# Patient Record
Sex: MALE | Race: WHITE | Employment: OTHER | URBAN - METROPOLITAN AREA
[De-identification: names, ages, dates, MRNs, and addresses within clinical notes are randomized per-mention and may not be internally consistent; named-entity substitution may affect disease eponyms.]

---

## 2021-04-10 ENCOUNTER — HOSPITAL ENCOUNTER (EMERGENCY)
Age: 86
Discharge: HOME | End: 2021-04-10
Attending: EMERGENCY MEDICINE
Payer: MEDICARE

## 2021-04-10 ENCOUNTER — APPOINTMENT (OUTPATIENT)
Dept: CT IMAGING | Age: 86
End: 2021-04-10
Attending: NURSE PRACTITIONER
Payer: MEDICARE

## 2021-04-10 VITALS
WEIGHT: 182 LBS | BODY MASS INDEX: 26.96 KG/M2 | TEMPERATURE: 98.7 F | RESPIRATION RATE: 16 BRPM | DIASTOLIC BLOOD PRESSURE: 76 MMHG | HEIGHT: 69 IN | SYSTOLIC BLOOD PRESSURE: 147 MMHG | HEART RATE: 87 BPM | OXYGEN SATURATION: 98 %

## 2021-04-10 DIAGNOSIS — R53.1 WEAKNESS: Primary | ICD-10-CM

## 2021-04-10 DIAGNOSIS — K62.89 ANAL PAIN: ICD-10-CM

## 2021-04-10 DIAGNOSIS — E86.0 DEHYDRATION: ICD-10-CM

## 2021-04-10 LAB
ALBUMIN SERPL-MCNC: 3.6 G/DL (ref 3.5–5)
ALBUMIN/GLOB SERPL: 1 {RATIO} (ref 1.1–2.2)
ALP SERPL-CCNC: 59 U/L (ref 45–117)
ALT SERPL-CCNC: 19 U/L (ref 12–78)
ANION GAP SERPL CALC-SCNC: 4 MMOL/L (ref 5–15)
APPEARANCE UR: CLEAR
AST SERPL-CCNC: 25 U/L (ref 15–37)
BACTERIA URNS QL MICRO: NEGATIVE /HPF
BASOPHILS # BLD: 0 K/UL (ref 0–0.1)
BASOPHILS NFR BLD: 1 % (ref 0–1)
BILIRUB SERPL-MCNC: 0.7 MG/DL (ref 0.2–1)
BILIRUB UR QL: NEGATIVE
BUN SERPL-MCNC: 13 MG/DL (ref 6–20)
BUN/CREAT SERPL: 12 (ref 12–20)
CALCIUM SERPL-MCNC: 8.8 MG/DL (ref 8.5–10.1)
CHLORIDE SERPL-SCNC: 103 MMOL/L (ref 97–108)
CO2 SERPL-SCNC: 27 MMOL/L (ref 21–32)
COLOR UR: ABNORMAL
COMMENT, HOLDF: NORMAL
CREAT SERPL-MCNC: 1.08 MG/DL (ref 0.7–1.3)
DIFFERENTIAL METHOD BLD: NORMAL
EOSINOPHIL # BLD: 0.1 K/UL (ref 0–0.4)
EOSINOPHIL NFR BLD: 2 % (ref 0–7)
EPITH CASTS URNS QL MICRO: ABNORMAL /LPF
ERYTHROCYTE [DISTWIDTH] IN BLOOD BY AUTOMATED COUNT: 12.7 % (ref 11.5–14.5)
GLOBULIN SER CALC-MCNC: 3.7 G/DL (ref 2–4)
GLUCOSE SERPL-MCNC: 100 MG/DL (ref 65–100)
GLUCOSE UR STRIP.AUTO-MCNC: NEGATIVE MG/DL
HCT VFR BLD AUTO: 41.6 % (ref 36.6–50.3)
HGB BLD-MCNC: 14 G/DL (ref 12.1–17)
HGB UR QL STRIP: ABNORMAL
HYALINE CASTS URNS QL MICRO: ABNORMAL /LPF (ref 0–5)
IMM GRANULOCYTES # BLD AUTO: 0 K/UL (ref 0–0.04)
IMM GRANULOCYTES NFR BLD AUTO: 0 % (ref 0–0.5)
KETONES UR QL STRIP.AUTO: ABNORMAL MG/DL
LEUKOCYTE ESTERASE UR QL STRIP.AUTO: NEGATIVE
LIPASE SERPL-CCNC: 173 U/L (ref 73–393)
LYMPHOCYTES # BLD: 1 K/UL (ref 0.8–3.5)
LYMPHOCYTES NFR BLD: 19 % (ref 12–49)
MCH RBC QN AUTO: 31 PG (ref 26–34)
MCHC RBC AUTO-ENTMCNC: 33.7 G/DL (ref 30–36.5)
MCV RBC AUTO: 92.2 FL (ref 80–99)
MONOCYTES # BLD: 0.5 K/UL (ref 0–1)
MONOCYTES NFR BLD: 9 % (ref 5–13)
NEUTS SEG # BLD: 3.7 K/UL (ref 1.8–8)
NEUTS SEG NFR BLD: 69 % (ref 32–75)
NITRITE UR QL STRIP.AUTO: NEGATIVE
NRBC # BLD: 0 K/UL (ref 0–0.01)
NRBC BLD-RTO: 0 PER 100 WBC
PH UR STRIP: 6.5 [PH] (ref 5–8)
PLATELET # BLD AUTO: 241 K/UL (ref 150–400)
PMV BLD AUTO: 8.9 FL (ref 8.9–12.9)
POTASSIUM SERPL-SCNC: 4.4 MMOL/L (ref 3.5–5.1)
PROT SERPL-MCNC: 7.3 G/DL (ref 6.4–8.2)
PROT UR STRIP-MCNC: NEGATIVE MG/DL
RBC # BLD AUTO: 4.51 M/UL (ref 4.1–5.7)
RBC #/AREA URNS HPF: ABNORMAL /HPF (ref 0–5)
SAMPLES BEING HELD,HOLD: NORMAL
SODIUM SERPL-SCNC: 134 MMOL/L (ref 136–145)
SP GR UR REFRACTOMETRY: 1.01 (ref 1–1.03)
UROBILINOGEN UR QL STRIP.AUTO: 0.2 EU/DL (ref 0.2–1)
WBC # BLD AUTO: 5.3 K/UL (ref 4.1–11.1)
WBC URNS QL MICRO: ABNORMAL /HPF (ref 0–4)

## 2021-04-10 PROCEDURE — 81001 URINALYSIS AUTO W/SCOPE: CPT

## 2021-04-10 PROCEDURE — 99285 EMERGENCY DEPT VISIT HI MDM: CPT

## 2021-04-10 PROCEDURE — 74177 CT ABD & PELVIS W/CONTRAST: CPT

## 2021-04-10 PROCEDURE — 74011000636 HC RX REV CODE- 636: Performed by: EMERGENCY MEDICINE

## 2021-04-10 PROCEDURE — 80053 COMPREHEN METABOLIC PANEL: CPT

## 2021-04-10 PROCEDURE — 72125 CT NECK SPINE W/O DYE: CPT

## 2021-04-10 PROCEDURE — 85025 COMPLETE CBC W/AUTO DIFF WBC: CPT

## 2021-04-10 PROCEDURE — 70450 CT HEAD/BRAIN W/O DYE: CPT

## 2021-04-10 PROCEDURE — 96360 HYDRATION IV INFUSION INIT: CPT

## 2021-04-10 PROCEDURE — 83690 ASSAY OF LIPASE: CPT

## 2021-04-10 PROCEDURE — 74011250636 HC RX REV CODE- 250/636: Performed by: NURSE PRACTITIONER

## 2021-04-10 RX ADMIN — SODIUM CHLORIDE 500 ML: 9 INJECTION, SOLUTION INTRAVENOUS at 10:45

## 2021-04-10 RX ADMIN — IOPAMIDOL 100 ML: 755 INJECTION, SOLUTION INTRAVENOUS at 11:07

## 2021-04-10 NOTE — PROGRESS NOTES
3:25 PM  CM received notification from [facility], pt accepted for home health services. Called and informed pt's daughter. Haylee Lewis    12:55 PM  CM met with pt, pt's spouse and their daughter in room to discuss assisted living resources as pt and pt's wife now agreeable to assisted living. CM gave pt's daughter a list of facilities and a contact for a company to assist with the search. Pt would benefit from home health until an assisted living facility can be located. CM spoke to MD and order placed. Family agreeable to referral to home health- [facility]. Will send referral via [employer]. Family requested list of private duty providers, CM provided to family. Their granddaughter will spend the night with them and their daughter will work on locating an assisted living facility and private duty providers.  Haylee Lewis

## 2021-04-10 NOTE — ED TRIAGE NOTES
2nd COVID vaccine on Wednesday and has been having diarrhea, right flank pain, and weakness. Pt reports formed stools today without and any pain but some residual weakness and dizziness on standing.

## 2021-04-10 NOTE — ED PROVIDER NOTES
This is an 82-year-old male who presents to the emergency room with complaints of diarrhea, right flank pain and generalized weakness since his second Covid vaccine on 30 March. Patient states he also fell and hit his head a few days ago. Patient is also complaining of hemorrhoid that was bothering him but has now resolved. According to daughter who is at bedside patient has had a change in his mental status since his last Covid vaccine on the 30th. Patient states it is worsened since he fell and hit his head. Has not sought hospitalization prior to today. Patient denies taking any blood thinners. Denies chest pain, shortness of breath. Is a little bit dizzy but concedes he has not been eating or drinking the way that he should. States he is having some difficulty urinating, mild burning. Denies any nausea or vomiting, fever or chills. Daughter states he has had new onset incontinence of both bowel and bladder and increased confusion. There are no further complaints at this time. Of note: Patient and family exploring the option of assisted living. Wenceslao Mckeon DO  Past Medical History:  No date: Ill-defined condition      Comment:  dizziness-does exercises for  this  No date: Ill-defined condition      Comment:  Glaucoma with MAcular Degeneration  Past Surgical History:  1953: HX APPENDECTOMY  12/2013: HX CATARACT REMOVAL; Left  3/2003: HX OTHER SURGICAL      Comment:  R eye retinal tear repair             Past Medical History:   Diagnosis Date    Ill-defined condition     dizziness-does exercises for  this    Ill-defined condition     Glaucoma with MAcular Degeneration       Past Surgical History:   Procedure Laterality Date    HX APPENDECTOMY  1953    HX CATARACT REMOVAL Left 12/2013    HX OTHER SURGICAL  3/2003    R eye retinal tear repair         No family history on file.     Social History     Socioeconomic History    Marital status:      Spouse name: Not on file    Number of children: Not on file    Years of education: Not on file    Highest education level: Not on file   Occupational History    Not on file   Social Needs    Financial resource strain: Not on file    Food insecurity     Worry: Not on file     Inability: Not on file    Transportation needs     Medical: Not on file     Non-medical: Not on file   Tobacco Use    Smoking status: Never Smoker   Substance and Sexual Activity    Alcohol use: No    Drug use: No    Sexual activity: Not on file   Lifestyle    Physical activity     Days per week: Not on file     Minutes per session: Not on file    Stress: Not on file   Relationships    Social connections     Talks on phone: Not on file     Gets together: Not on file     Attends Gnosticist service: Not on file     Active member of club or organization: Not on file     Attends meetings of clubs or organizations: Not on file     Relationship status: Not on file    Intimate partner violence     Fear of current or ex partner: Not on file     Emotionally abused: Not on file     Physically abused: Not on file     Forced sexual activity: Not on file   Other Topics Concern    Not on file   Social History Narrative    Not on file         ALLERGIES: Patient has no known allergies. Review of Systems   Constitutional: Positive for appetite change. Negative for activity change, chills, fatigue and fever. HENT: Negative for congestion, ear discharge, ear pain, sinus pressure, sinus pain, sore throat and trouble swallowing. Eyes: Negative for photophobia, pain, redness, itching and visual disturbance. Respiratory: Negative for chest tightness and shortness of breath. Cardiovascular: Negative for chest pain and palpitations. Gastrointestinal: Negative for abdominal distention, abdominal pain, nausea and vomiting. Endocrine: Negative. Genitourinary: Positive for difficulty urinating and dysuria. Negative for frequency and urgency.    Musculoskeletal: Negative for back pain, neck pain and neck stiffness. Skin: Negative for color change, pallor, rash and wound. Allergic/Immunologic: Negative. Neurological: Positive for dizziness, weakness and light-headedness. Negative for syncope and headaches. Hematological: Does not bruise/bleed easily. Psychiatric/Behavioral: Positive for confusion. Negative for behavioral problems. The patient is not nervous/anxious. Vitals:    04/10/21 0845   Pulse: 80   Resp: 16   Temp: 98.7 °F (37.1 °C)   SpO2: 99%   Weight: 82.6 kg (182 lb)   Height: 5' 9\" (1.753 m)            Physical Exam  Vitals signs and nursing note reviewed. Constitutional:       General: He is not in acute distress. Appearance: Normal appearance. He is well-developed. He is not ill-appearing. HENT:      Head: Normocephalic and atraumatic. Right Ear: External ear normal.      Left Ear: External ear normal.      Nose: Nose normal.      Mouth/Throat:      Mouth: Mucous membranes are moist.   Eyes:      General:         Right eye: No discharge. Left eye: No discharge. Conjunctiva/sclera: Conjunctivae normal.      Pupils: Pupils are equal, round, and reactive to light. Neck:      Musculoskeletal: Normal range of motion and neck supple. Vascular: No JVD. Trachea: No tracheal deviation. Cardiovascular:      Rate and Rhythm: Normal rate and regular rhythm. Pulses: Normal pulses. Heart sounds: Normal heart sounds. No murmur. No gallop. Pulmonary:      Effort: Pulmonary effort is normal. No respiratory distress. Breath sounds: Normal breath sounds. No wheezing or rales. Chest:      Chest wall: No tenderness. Abdominal:      General: Bowel sounds are normal. There is no distension. Palpations: Abdomen is soft. Tenderness: There is no abdominal tenderness. There is no guarding or rebound. Genitourinary:     Comments: Stated difficulty urinating  Musculoskeletal: Normal range of motion. General: No tenderness. Skin:     General: Skin is warm and dry. Capillary Refill: Capillary refill takes 2 to 3 seconds. Coloration: Skin is not pale. Findings: No erythema or rash. Neurological:      General: No focal deficit present. Mental Status: He is alert. Motor: Weakness present. Coordination: Coordination normal.      Comments: Confusion at times     Psychiatric:         Behavior: Behavior normal.         Thought Content: Thought content normal.         Judgment: Judgment normal.          MDM  Number of Diagnoses or Management Options  Anal pain: new and requires workup  Dehydration: new and requires workup  Weakness: new and requires workup  Diagnosis management comments: Differential diagnosis includes head bleed, urinary tract infection, dementia and others. After review of laboratory data, imaging and physical assessment patient was discharged home and will follow up with PCP. Case management involved and was given referrals for assisted living facility. Return to the emergency room with worsening symptoms. Patient, wife and daughter all in agreement with plan of care. Discussed with Dr. Alexis Fair who is in agreement with plan of care.        Amount and/or Complexity of Data Reviewed  Clinical lab tests: ordered and reviewed  Tests in the radiology section of CPT®: ordered and reviewed  Discuss the patient with other providers: yes (Dr. Alexis Fair)         Labs Reviewed   URINALYSIS W/MICROSCOPIC - Abnormal; Notable for the following components:       Result Value    Ketone TRACE (*)     Blood TRACE (*)     All other components within normal limits   METABOLIC PANEL, COMPREHENSIVE - Abnormal; Notable for the following components:    Sodium 134 (*)     Anion gap 4 (*)     A-G Ratio 1.0 (*)     All other components within normal limits   SAMPLES BEING HELD   CBC WITH AUTOMATED DIFF   LIPASE     Ct Head Wo Cont    Result Date: 4/10/2021  No acute intracranial process is identified. Ct Spine Cerv Wo Cont    Result Date: 4/10/2021  1. No evidence of acute fracture. Ct Abd Pelv W Cont    Result Date: 4/10/2021  1. No acute intra-abdominal pathology. 2.  Small hyperdense nodular foci posterior to the gallbladder which appear to be extraluminal and may represent prominent veins. Consider nonemergent ultrasound. 3.  Focal aneurysmal dilatation or atherosclerotic ulcer in the abdominal aorta measuring 3 cm. 4.  Small nodular opacities in the anterior aspect of the right middle lobe which have the appearance of a chronic infectious or inflammatory process. 1:43 PM  Pt has been reexamined. Pt has no new complaints, changes or physical findings. Care plan outlined and precautions discussed. All available results were reviewed with pt. All medications were reviewed with pt. All of pt's questions and concerns were addressed. Pt agrees to F/U as instructed and agrees to return to ED upon further deterioration. Pt is ready to go home. Kathy Grier NP    Please note that this dictation was completed with [employer], the computer voice recognition software. Quite often unanticipated grammatical, syntax, homophones, and other interpretive errors are inadvertently transcribed by the computer software. Please disregard these errors. Please excuse any errors that have escaped final proofreading. Thank you.     Procedures

## 2021-04-20 ENCOUNTER — HOSPITAL ENCOUNTER (EMERGENCY)
Age: 86
Discharge: HOME | End: 2021-04-21
Attending: EMERGENCY MEDICINE
Payer: MEDICARE

## 2021-04-20 DIAGNOSIS — R29.6 UNWITNESSED FALL: Primary | ICD-10-CM

## 2021-04-20 DIAGNOSIS — G20 DEMENTIA DUE TO PARKINSON'S DISEASE WITHOUT BEHAVIORAL DISTURBANCE (HCC): ICD-10-CM

## 2021-04-20 DIAGNOSIS — S42.295A OTHER CLOSED NONDISPLACED FRACTURE OF PROXIMAL END OF LEFT HUMERUS, INITIAL ENCOUNTER: ICD-10-CM

## 2021-04-20 DIAGNOSIS — F02.80 DEMENTIA DUE TO PARKINSON'S DISEASE WITHOUT BEHAVIORAL DISTURBANCE (HCC): ICD-10-CM

## 2021-04-20 PROCEDURE — 99284 EMERGENCY DEPT VISIT MOD MDM: CPT

## 2021-04-20 RX ORDER — GLUCOSAMINE SULFATE 1500 MG
1 POWDER IN PACKET (EA) ORAL DAILY
COMMUNITY

## 2021-04-20 RX ORDER — CARBIDOPA AND LEVODOPA 25; 100 MG/1; MG/1
1 TABLET ORAL 3 TIMES DAILY
COMMUNITY

## 2021-04-20 RX ORDER — MELATONIN 5 MG
5 CAPSULE ORAL
COMMUNITY

## 2021-04-20 RX ORDER — LANOLIN ALCOHOL/MO/W.PET/CERES
1000 CREAM (GRAM) TOPICAL DAILY
COMMUNITY

## 2021-04-20 RX ORDER — CYANOCOBALAMIN 1000 UG/ML
1000 INJECTION, SOLUTION INTRAMUSCULAR; SUBCUTANEOUS
COMMUNITY

## 2021-04-21 ENCOUNTER — APPOINTMENT (OUTPATIENT)
Dept: CT IMAGING | Age: 86
End: 2021-04-21
Attending: EMERGENCY MEDICINE
Payer: MEDICARE

## 2021-04-21 ENCOUNTER — APPOINTMENT (OUTPATIENT)
Dept: GENERAL RADIOLOGY | Age: 86
End: 2021-04-21
Attending: EMERGENCY MEDICINE
Payer: MEDICARE

## 2021-04-21 VITALS
HEART RATE: 99 BPM | OXYGEN SATURATION: 96 % | RESPIRATION RATE: 17 BRPM | SYSTOLIC BLOOD PRESSURE: 137 MMHG | DIASTOLIC BLOOD PRESSURE: 69 MMHG | TEMPERATURE: 97.8 F

## 2021-04-21 PROCEDURE — 73060 X-RAY EXAM OF HUMERUS: CPT

## 2021-04-21 PROCEDURE — 70450 CT HEAD/BRAIN W/O DYE: CPT

## 2021-04-21 PROCEDURE — 74011250637 HC RX REV CODE- 250/637: Performed by: EMERGENCY MEDICINE

## 2021-04-21 PROCEDURE — 72125 CT NECK SPINE W/O DYE: CPT

## 2021-04-21 RX ORDER — ACETAMINOPHEN 500 MG
1000 TABLET ORAL
Status: DISCONTINUED | OUTPATIENT
Start: 2021-04-21 | End: 2021-04-21 | Stop reason: HOSPADM

## 2021-04-21 RX ORDER — POLYETHYLENE GLYCOL 3350 17 G/17G
17 POWDER, FOR SOLUTION ORAL DAILY
Qty: 238 G | Refills: 0 | Status: SHIPPED | OUTPATIENT
Start: 2021-04-21 | End: 2021-05-05

## 2021-04-21 RX ORDER — OXYCODONE HYDROCHLORIDE 5 MG/1
5 TABLET ORAL
Status: COMPLETED | OUTPATIENT
Start: 2021-04-21 | End: 2021-04-21

## 2021-04-21 RX ORDER — ACETAMINOPHEN 500 MG
1000 TABLET ORAL 3 TIMES DAILY
Qty: 24 TAB | Refills: 0 | Status: SHIPPED | OUTPATIENT
Start: 2021-04-21 | End: 2021-04-25

## 2021-04-21 RX ORDER — OXYCODONE HYDROCHLORIDE 5 MG/1
5 TABLET ORAL
Qty: 10 TAB | Refills: 0 | Status: SHIPPED | OUTPATIENT
Start: 2021-04-21 | End: 2021-04-24

## 2021-04-21 RX ORDER — ACETAMINOPHEN 500 MG
1000 TABLET ORAL
Status: COMPLETED | OUTPATIENT
Start: 2021-04-21 | End: 2021-04-21

## 2021-04-21 RX ORDER — OXYCODONE HYDROCHLORIDE 5 MG/1
5 TABLET ORAL
Status: DISCONTINUED | OUTPATIENT
Start: 2021-04-21 | End: 2021-04-21 | Stop reason: HOSPADM

## 2021-04-21 RX ORDER — OXYCODONE HYDROCHLORIDE 5 MG/1
5 TABLET ORAL
Qty: 10 TAB | Refills: 0 | Status: SHIPPED | OUTPATIENT
Start: 2021-04-21 | End: 2021-04-21 | Stop reason: SDUPTHER

## 2021-04-21 RX ADMIN — OXYCODONE 5 MG: 5 TABLET ORAL at 01:42

## 2021-04-21 RX ADMIN — Medication 1000 MG: at 01:42

## 2021-04-21 NOTE — ED NOTES
Received shift change report from VIGNESH JONES. While at bedside, we cleaned and redressed a preexisting left elbow skin tear dressing per protocol. With 4 total staff members, we repositioned the sling and swathe to left arm. Good color and motion to left hand fingertips. Patient tolerated procedure well.

## 2021-04-21 NOTE — ED NOTES
The patient was discharged home by Transportation Team in stable condition. The patient is alert and in no respiratory distress. The patient's diagnosis, condition and treatment were explained per night shift RN here to the Staff at the nursing Home. A discharge plan has been developed. A  was not involved in the process. Aftercare instructions were given.

## 2021-04-21 NOTE — ED NOTES
Wound care done on right forearm. Pt has a 6 in skin tear. Wound cleansed with CarraKlense. Vaseline guaze applied. Covered with ABD pad, wrapped with praveen and then secured with loose fitting coban dressing to keep in place.

## 2021-04-21 NOTE — ED PROVIDER NOTES
82-year-old male with significant past medical history of Parkinson's and dementia presenting by EMS from nursing facility after patient had a fall earlier today and had outpatient x-rays showing left radial head fracture. Nursing staff reports that fall was unwitnessed. There is no signs of head trauma. No anticoagulants. Patient was continued have pain in the arm. No report of any other injuries. Patient at his baseline mental status. Report of any fevers or chills or abdominal pain. Unclear if patient hit his head. According to nursing home paperwork patient is full code. Past Medical History:   Diagnosis Date    Abdominal aortic aneurysm (AAA) ([facility] [address]     Diverticulitis     Glaucoma     Hypertension     Ill-defined condition     dizziness-does exercises for  this    Ill-defined condition     Glaucoma with MAcular Degeneration    Insomnia        Past Surgical History:   Procedure Laterality Date    HX APPENDECTOMY  1953    HX CATARACT REMOVAL Left 12/2013    HX OTHER SURGICAL  3/2003    R eye retinal tear repair         History reviewed. No pertinent family history.     Social History     Socioeconomic History    Marital status:      Spouse name: Not on file    Number of children: Not on file    Years of education: Not on file    Highest education level: Not on file   Occupational History    Not on file   Social Needs    Financial resource strain: Not on file    Food insecurity     Worry: Not on file     Inability: Not on file    Transportation needs     Medical: Not on file     Non-medical: Not on file   Tobacco Use    Smoking status: Never Smoker    Smokeless tobacco: Never Used   Substance and Sexual Activity    Alcohol use: No    Drug use: No    Sexual activity: Not on file   Lifestyle    Physical activity     Days per week: Not on file     Minutes per session: Not on file    Stress: Not on file   Relationships    Social connections     Talks on phone: Not on file     Gets together: Not on file     Attends Yazdanism service: Not on file     Active member of club or organization: Not on file     Attends meetings of clubs or organizations: Not on file     Relationship status: Not on file    Intimate partner violence     Fear of current or ex partner: Not on file     Emotionally abused: Not on file     Physically abused: Not on file     Forced sexual activity: Not on file   Other Topics Concern    Not on file   Social History Narrative    Not on file         ALLERGIES: Patient has no known allergies. Review of Systems   Unable to perform ROS: Dementia       Vitals:    04/20/21 2345 04/20/21 2355 04/21/21 0301   BP: 104/79  (!) 138/114   Pulse: (!) 106  (!) 101   Resp: 20  22   Temp:   98.8 °F (37.1 °C)   SpO2: 90% 90% 95%            Physical Exam  Constitutional:       Appearance: Normal appearance. HENT:      Head: Normocephalic and atraumatic. Nose: Nose normal.      Mouth/Throat:      Pharynx: Oropharynx is clear. Eyes:      Extraocular Movements: Extraocular movements intact. Conjunctiva/sclera: Conjunctivae normal.      Pupils: Pupils are equal, round, and reactive to light. Neck:      Musculoskeletal: Normal range of motion and neck supple. No muscular tenderness. Cardiovascular:      Rate and Rhythm: Normal rate. Pulses: Normal pulses. Radial pulses are 2+ on the left side. Pulmonary:      Effort: Pulmonary effort is normal. No respiratory distress. Chest:      Chest wall: No tenderness. Abdominal:      General: Abdomen is flat. Bowel sounds are normal.      Tenderness: There is no abdominal tenderness. Musculoskeletal:         General: Tenderness, deformity and signs of injury present. No swelling. Left upper arm: He exhibits tenderness, bony tenderness and swelling. Skin:     General: Skin is warm. Capillary Refill: Capillary refill takes less than 2 seconds.    Neurological:      General: No focal deficit present. Mental Status: He is alert. Mental status is at baseline. He is disoriented and confused. Cranial Nerves: Cranial nerves are intact. Sensory: Sensation is intact. Motor: Motor function is intact. MDM  Number of Diagnoses or Management Options  Dementia due to Parkinson's disease without behavioral disturbance ([facility] [address]  Other closed nondisplaced fracture of proximal end of left humerus, initial encounter  Unwitnessed fall  Diagnosis management comments: Patient who had a fall resulting in fracture of his left humerus. X-ray confirmed the ER. Patient sling. Pain medication. Ordered CTA head and neck due to unwitnessed fall. Imaging unremarkable. We will have patient follow-up with orthopedics outpatient. Staff reports that patient is at his baseline and acting normally other than having pain in the arm since his fall. Amount and/or Complexity of Data Reviewed  Tests in the radiology section of CPT®: reviewed           Procedures    No results found for this or any previous visit (from the past 24 hour(s)). Xr Humerus Lt    Result Date: 4/21/2021  EXAM: XR HUMERUS LT History: Shoulder injury INDICATION: injury. COMPARISON: None. FINDINGS: Two views of the left humerus demonstrate comminuted and mildly displaced deformity of the head. .     Comminuted and mildly displaced deformity of the humeral head. Ct Head Wo Cont    Result Date: 4/21/2021  CLINICAL HISTORY: fall INDICATION: fall COMPARISON: 4/10/2021. CT dose reduction was achieved through use of a standardized protocol tailored for this examination and automatic exposure control for dose modulation. TECHNIQUE: Serial axial images with a collimation of 5 mm were obtained from the skull base through the vertex  FINDINGS: There is sulcal and ventricular prominence. Confluent periventricular and scattered foci of hypodensity in the cerebral white matter.  There is no evidence of an acute infarction, hemorrhage, or mass-effect. There is no evidence of midline shift or hydrocephalus. Posterior fossa structures are unremarkable. No extra-axial collections are seen. Mastoid air cells are well pneumatized and clear. There is no evidence of depressed skull fractures of soft tissue swelling. No acute intracranial process. Imaging findings consistent with minimal chronic microvascular ischemic change. There is a moderate to severe degree of cerebral atrophy. Ct Spine Cerv Wo Cont    Result Date: 4/21/2021  EXAM:  CT CERVICAL SPINE WITHOUT CONTRAST EXAM: CT SPINE CERV WO CONT CLINICAL HISTORY: fall, pain INDICATION: fall, pain COMPARISON:  4/10/2021 TECHNIQUE:  Axial neck CT was performed. Noncontrast imaging obtained. Coronal and sagittal reconstructions were performed. CT dose reduction was achieved through use of a standardized protocol tailored for this examination and automatic exposure control for dose modulation. Osseous/bone algorithm was utilized. FINDINGS: The vertebral bodies are anatomically aligned. There is no evidence of fracture or subluxation. The prevertebral soft tissues are grossly within normal limits. The atlantodental interval is within normal limits. The craniocervical junction is intact. Canal stenosis at C5-6. Apical pleural thickening and scar. There is no acute fracture or dislocation identified.

## 2021-04-21 NOTE — ED NOTES
TRANSFER - OUT REPORT:    Verbal report given to Transport Team (name) on Alma Rosa Vee  being transferred to [facility] (unit) for routine progression of care       Report consisted of patients Situation, Background, Assessment and   Recommendations(SBAR). Information from the following report(s) SBAR was reviewed with the receiving nurse. Lines:   Peripheral IV 04/21/21 Right Arm (Active)   Site Assessment Clean, dry, & intact 04/21/21 0028   Phlebitis Assessment 0 04/21/21 0028   Infiltration Assessment 0 04/21/21 0028   Dressing Status Clean, dry, & intact 04/21/21 0028        Opportunity for questions and clarification was provided.       Patient transported with:   Monitor

## 2021-04-21 NOTE — ED NOTES
Discharge Assessment: Patient  in no distress. Physical re-examination reveals some improvement in pts condition with reassessment of vital signs completed at the time of admission discharge. Sling and swathe placed on night shift, then readjusted.  All skin tears dressed per orders; preexisting wounds noted

## 2021-04-21 NOTE — ED TRIAGE NOTES
Pt here with left arm pain . Outpatient xray confirmed a fracture.  Pulses, sensation, and movement distal to injury intact